# Patient Record
Sex: MALE | Race: BLACK OR AFRICAN AMERICAN | NOT HISPANIC OR LATINO | Employment: UNEMPLOYED | URBAN - METROPOLITAN AREA
[De-identification: names, ages, dates, MRNs, and addresses within clinical notes are randomized per-mention and may not be internally consistent; named-entity substitution may affect disease eponyms.]

---

## 2022-04-11 ENCOUNTER — HISTORICAL (OUTPATIENT)
Dept: ADMINISTRATIVE | Facility: HOSPITAL | Age: 37
End: 2022-04-11

## 2022-04-25 VITALS
SYSTOLIC BLOOD PRESSURE: 133 MMHG | HEIGHT: 65 IN | DIASTOLIC BLOOD PRESSURE: 87 MMHG | BODY MASS INDEX: 25.16 KG/M2 | WEIGHT: 151 LBS | OXYGEN SATURATION: 98 %

## 2022-09-19 ENCOUNTER — HOSPITAL ENCOUNTER (EMERGENCY)
Facility: HOSPITAL | Age: 37
Discharge: HOME | End: 2022-09-19
Attending: INTERNAL MEDICINE

## 2022-09-19 VITALS
WEIGHT: 157.44 LBS | HEART RATE: 85 BPM | DIASTOLIC BLOOD PRESSURE: 94 MMHG | BODY MASS INDEX: 26.23 KG/M2 | OXYGEN SATURATION: 97 % | SYSTOLIC BLOOD PRESSURE: 155 MMHG | TEMPERATURE: 98 F | HEIGHT: 65 IN | RESPIRATION RATE: 14 BRPM

## 2022-09-19 DIAGNOSIS — R21 RASH: Primary | ICD-10-CM

## 2022-09-19 PROCEDURE — 63600175 PHARM REV CODE 636 W HCPCS: Performed by: NURSE PRACTITIONER

## 2022-09-19 PROCEDURE — 99284 EMERGENCY DEPT VISIT MOD MDM: CPT | Mod: 25

## 2022-09-19 RX ORDER — METHYLPREDNISOLONE 4 MG/1
TABLET ORAL
Qty: 21 EACH | Refills: 0 | Status: SHIPPED | OUTPATIENT
Start: 2022-09-19

## 2022-09-19 RX ORDER — HYDROXYZINE HYDROCHLORIDE 25 MG/1
25 TABLET, FILM COATED ORAL 4 TIMES DAILY PRN
Qty: 28 TABLET | Refills: 0 | Status: SHIPPED | OUTPATIENT
Start: 2022-09-19 | End: 2022-09-26

## 2022-09-19 RX ORDER — PREDNISONE 10 MG/1
20 TABLET ORAL
Status: COMPLETED | OUTPATIENT
Start: 2022-09-19 | End: 2022-09-19

## 2022-09-19 RX ADMIN — PREDNISONE 20 MG: 10 TABLET ORAL at 01:09

## 2022-09-19 NOTE — DISCHARGE INSTRUCTIONS
Follow up with your primary care physician in 3-5 days for follow up evaluation.   Keep affected area of skin dry.  Return to the [facility] ED in 3 days for worsening redness, tenderness, or drainage from area.

## 2022-09-19 NOTE — Clinical Note
"John Caro" Mahamedjarrod was seen and treated in our emergency department on 9/19/2022.  He may return to work on 09/21/2022.       If you have any questions or concerns, please don't hesitate to call.      KIMBERLY Witt RN    "

## 2022-09-19 NOTE — ED PROVIDER NOTES
Encounter Date: 9/19/2022       History     Chief Complaint   Patient presents with    Rash     Pt in with complaints of a rash to the left arm.     Pt is a 36 y.o. male who presents to the [facility] ED complaining of a rash to his Lt elbow x 4 days. Pt works in [employer] and began to have symptoms after mowing. Denies swelling or rash to face, swelling to lips/tongue, or difficulty breathing. Denies chest pain, SOB, weakness, or dizziness.     Review of patient's allergies indicates:  No Known Allergies  History reviewed. No pertinent past medical history.  No past surgical history on file.  No family history on file.  Social History     Tobacco Use    Smoking status: Never    Smokeless tobacco: Never     Review of Systems   Constitutional:  Negative for chills, diaphoresis, fatigue and fever.   HENT:  Negative for facial swelling, postnasal drip, rhinorrhea, sinus pressure, sinus pain, sore throat and trouble swallowing.    Respiratory:  Negative for cough, chest tightness, shortness of breath and wheezing.    Cardiovascular:  Negative for chest pain, palpitations and leg swelling.   Gastrointestinal:  Negative for abdominal pain, diarrhea, nausea and vomiting.   Genitourinary:  Negative for dysuria, flank pain, hematuria and urgency.   Musculoskeletal:  Negative for arthralgias, back pain and myalgias.   Skin:  Positive for rash. Negative for color change.   Neurological:  Negative for dizziness, syncope, weakness and headaches.   Hematological:  Does not bruise/bleed easily.   All other systems reviewed and are negative.    Physical Exam     Initial Vitals [09/19/22 1253]   BP Pulse Resp Temp SpO2   (!) 155/94 85 14 97.5 °F (36.4 °C) 97 %      MAP       --         Physical Exam    Nursing note and vitals reviewed.  Constitutional: Vital signs are normal. He appears well-developed and well-nourished.   HENT:   Head: Normocephalic.   Nose: Nose normal.   Mouth/Throat: Oropharynx is clear and moist.   Eyes:  Conjunctivae and EOM are normal. Pupils are equal, round, and reactive to light.   Neck: Neck supple.   Normal range of motion.  Cardiovascular:  Normal rate, regular rhythm, normal heart sounds and intact distal pulses.           Pulmonary/Chest: Effort normal and breath sounds normal. No respiratory distress. He has no wheezes. He has no rhonchi. He has no rales. He exhibits no tenderness.   Abdominal: Abdomen is soft and flat. Bowel sounds are normal. There is no abdominal tenderness. There is no rebound, no guarding, no tenderness at McBurney's point and negative Abrams's sign.   Musculoskeletal:         General: Normal range of motion.      Cervical back: Normal range of motion and neck supple.     Neurological: He is alert and oriented to person, place, and time. He has normal strength.   Skin: Skin is warm and dry. Capillary refill takes less than 2 seconds. Rash (vesicular to anterior aspect of Lt elbow) noted.        Psychiatric: He has a normal mood and affect. His behavior is normal. Judgment and thought content normal.       ED Course   Procedures  Labs Reviewed - No data to display       Imaging Results    None          Medications   predniSONE tablet 20 mg (20 mg Oral Given 9/19/22 1305)     Medical Decision Making:   Differential Diagnosis:   Skin rash  Allergic reaction  ED Management:  1:10 PM Reassessed patient at this time. Reports condition has improved. Discussed with patient all pertinent ED information and results. Discussed diagnosis and treatment plan with patient. Follow up instructions and return to ED instruction have been given. All questions and concerns were addressed at this time. Patient voices understanding of information and instructions. Patient is comfortable with plan and discharge. Patient is stable for discharge.                           Clinical Impression:   Final diagnoses:  [R21] Rash (Primary)      ED Disposition Condition    Discharge Stable          ED Prescriptions        Medication Sig Dispense Start Date End Date Auth. Provider    methylPREDNISolone (MEDROL DOSEPACK) 4 mg tablet use as directed 21 each 9/19/2022 -- Hernandez Parks Jr., JOE    hydrOXYzine HCL (ATARAX) 25 MG tablet Take 1 tablet (25 mg total) by mouth 4 (four) times daily as needed for Itching. 28 tablet 9/19/2022 9/26/2022 JOE Rowan Jr.          Follow-up Information       Follow up With Specialties Details Why Contact Info    [facility] - Emergency Dept Emergency Medicine In 3 days As needed, If symptoms worsen [address] [facility] [id number] [phone]             Hernandez Parks Jr., JOE  09/19/22 3389

## 2023-09-01 ENCOUNTER — HOSPITAL ENCOUNTER (EMERGENCY)
Facility: HOSPITAL | Age: 38
Discharge: HOME | End: 2023-09-01
Attending: FAMILY MEDICINE

## 2023-09-01 VITALS
HEART RATE: 85 BPM | WEIGHT: 153.25 LBS | BODY MASS INDEX: 25.53 KG/M2 | SYSTOLIC BLOOD PRESSURE: 146 MMHG | HEIGHT: 65 IN | OXYGEN SATURATION: 98 % | DIASTOLIC BLOOD PRESSURE: 95 MMHG | RESPIRATION RATE: 16 BRPM | TEMPERATURE: 98 F

## 2023-09-01 DIAGNOSIS — H92.01 OTALGIA OF RIGHT EAR: Primary | ICD-10-CM

## 2023-09-01 DIAGNOSIS — H61.21 IMPACTED CERUMEN OF RIGHT EAR: ICD-10-CM

## 2023-09-01 PROCEDURE — 99283 EMERGENCY DEPT VISIT LOW MDM: CPT

## 2023-09-01 RX ORDER — IBUPROFEN 800 MG/1
800 TABLET ORAL EVERY 6 HOURS PRN
Qty: 12 TABLET | Refills: 0 | Status: SHIPPED | OUTPATIENT
Start: 2023-09-01

## 2023-09-01 NOTE — ED PROVIDER NOTES
Encounter Date: 9/1/2023       History     Chief Complaint   Patient presents with    Otalgia     Reports R ear pain for approx 1 week. States feels that something may have gotten into his ear. Reports entire side of face is now hurting.     John Wood is a 37 y.o. male who presents to the ED complaining of right ear pain x 1 week. Reports pain has started to radiate down the right side of his face. Hearing in right ear is muffled. He denies fevers, chills, ear drainage, sore throat.     The history is provided by the patient.     Review of patient's allergies indicates:  No Known Allergies  No past medical history on file.  No past surgical history on file.  No family history on file.  Social History     Tobacco Use    Smoking status: Never    Smokeless tobacco: Never     Review of Systems   Constitutional:  Negative for activity change, chills and fever.   HENT:  Positive for ear pain. Negative for ear discharge and trouble swallowing.    Eyes:  Negative for photophobia and visual disturbance.   Respiratory:  Negative for chest tightness, shortness of breath and wheezing.    Cardiovascular:  Negative for chest pain, palpitations and leg swelling.   Gastrointestinal:  Negative for abdominal pain, constipation, diarrhea, nausea and vomiting.   Genitourinary:  Negative for dysuria, frequency, hematuria and urgency.   Musculoskeletal:  Negative for arthralgias, back pain and gait problem.   Skin:  Negative for color change and rash.   Neurological:  Negative for dizziness, syncope, weakness, light-headedness, numbness and headaches.   Psychiatric/Behavioral:  Negative for agitation and confusion. The patient is not nervous/anxious.        Physical Exam     Initial Vitals [09/01/23 1613]   BP Pulse Resp Temp SpO2   (!) 146/95 85 16 98.4 °F (36.9 °C) 98 %      MAP       --         Physical Exam    Nursing note and vitals reviewed.  Constitutional: He appears well-developed and well-nourished. No distress.   HENT:    Head: Normocephalic and atraumatic.   Left Ear: Tympanic membrane, external ear and ear canal normal.   Mouth/Throat: No oropharyngeal exudate.   Large amount of cerumen in left ear.   Cerumen impaction of right ear. Cannot visualize TM. External ear normal. No TTP.    Eyes: EOM are normal. No scleral icterus.   Neck: Neck supple.   Normal range of motion.  Cardiovascular:  Normal rate and regular rhythm.           No murmur heard.  Pulmonary/Chest: No respiratory distress. He has no wheezes.   Abdominal: Abdomen is soft. He exhibits no distension. There is no abdominal tenderness.   Musculoskeletal:         General: No edema. Normal range of motion.      Cervical back: Normal range of motion and neck supple.     Neurological: He is alert and oriented to person, place, and time. No cranial nerve deficit.   Skin: Skin is warm and dry. Capillary refill takes less than 2 seconds. No erythema.   Psychiatric: He has a normal mood and affect. Thought content normal.         ED Course   Procedures  Labs Reviewed - No data to display       Imaging Results    None          Medications - No data to display  Medical Decision Making  Risk  OTC drugs.  Prescription drug management.                          Medical Decision Making:   Initial Assessment:   Resting comfortably in NAD. HDS and afebrile. Right cerumen impaction  Differential Diagnosis:   Cerumen impaction, otitis externa, otitis media  ED Management:  Pt with cerumen impaction of right ear. Will prescribe debrox drops. Encouraged him to use in both ears as he also has a large amount of cerumen on the left. Encouraged close follow up with PCP. ED return precautions given for any new or worsening symptoms. He verbalized understanding. All questions answered.       Clinical Impression:   Final diagnoses:  [H92.01] Otalgia of right ear (Primary)  [H61.21] Impacted cerumen of right ear        ED Disposition Condition    Discharge Stable          ED Prescriptions        Medication Sig Dispense Start Date End Date Auth. Provider    carbamide peroxide (DEBROX) 6.5 % otic solution Place 5 drops into both ears as needed (Removal of ear wax). 15 mL 9/1/2023 -- Ana Kraft PA-C    ibuprofen (ADVIL,MOTRIN) 800 MG tablet Take 1 tablet (800 mg total) by mouth every 6 (six) hours as needed for Pain. 12 tablet 9/1/2023 -- Ana Kraft PA-C          Follow-up Information       Follow up With Specialties Details Why Contact Info    [facility] Emergency Dept Emergency Medicine  If symptoms worsen [address] [facility] [address] [phone]    PCP  In 3 days Hospital follow up              Ana Kraft PA-C  09/01/23 3471